# Patient Record
Sex: FEMALE | Race: OTHER | HISPANIC OR LATINO | URBAN - METROPOLITAN AREA
[De-identification: names, ages, dates, MRNs, and addresses within clinical notes are randomized per-mention and may not be internally consistent; named-entity substitution may affect disease eponyms.]

---

## 2020-01-12 ENCOUNTER — EMERGENCY (EMERGENCY)
Facility: HOSPITAL | Age: 75
LOS: 1 days | Discharge: HOME | End: 2020-01-12
Attending: EMERGENCY MEDICINE
Payer: COMMERCIAL

## 2020-01-12 VITALS
WEIGHT: 117.95 LBS | SYSTOLIC BLOOD PRESSURE: 126 MMHG | HEART RATE: 103 BPM | DIASTOLIC BLOOD PRESSURE: 86 MMHG | TEMPERATURE: 98 F | HEIGHT: 60 IN | RESPIRATION RATE: 18 BRPM | OXYGEN SATURATION: 96 %

## 2020-01-12 PROCEDURE — 99283 EMERGENCY DEPT VISIT LOW MDM: CPT | Mod: 25

## 2020-01-12 PROCEDURE — 29125 APPL SHORT ARM SPLINT STATIC: CPT | Mod: RT

## 2020-01-12 PROCEDURE — 29125 APPL SHORT ARM SPLINT STATIC: CPT

## 2020-01-12 PROCEDURE — 99284 EMERGENCY DEPT VISIT MOD MDM: CPT | Mod: 25

## 2020-01-12 PROCEDURE — 73110 X-RAY EXAM OF WRIST: CPT

## 2020-01-12 PROCEDURE — 73110 X-RAY EXAM OF WRIST: CPT | Mod: 26,RT

## 2020-01-12 NOTE — ED ADULT NURSE NOTE - OBJECTIVE STATEMENT
75 yo F aaox4 c/o of R wrist pain onset yesterday s/p fall. Pain radiates from wrist down forearm, but denies numbness and tingling. Bruising noted. No active bleeding. No head trauma no loc. Provider at bedside.

## 2020-01-12 NOTE — ED ADULT NURSE NOTE - NSIMPLEMENTINTERV_GEN_ALL_ED
Implemented All Universal Safety Interventions:  [place] to call system. Call bell, personal items and telephone within reach. Instruct patient to call for assistance. Room bathroom lighting operational. Non-slip footwear when patient is off stretcher. Physically safe environment: no spills, clutter or unnecessary equipment. Stretcher in lowest position, wheels locked, appropriate side rails in place.

## 2020-01-12 NOTE — ED PROVIDER NOTE - NSFOLLOWUPINSTRUCTIONS_ED_ALL_ED_FT
Follow up  ith Dr. De Oliveira for orthopedist   [phone]    keep splint in place-do not remove or get wet  Keep elevated  Do not wear rings on right hand  ice packs to be applied over splint for 20 min at a time    Return to the ER for any concerns    -- Please use 650mg Tylenol (also called acetaminophen) every 4 hours & 600mg Motrin (also called Advil or ibuprofen) every 6 hours as needed for pain/discomfort/swelling. You can get these without a prescription. Don't use more than 3500mg of Tylenol in any 24-hour period. Make sure your other prescription/over-the-counter medications don't contain any Tylenol so you don't take too much. If you have any stomach discomfort while taking Motrin, you can use TUMS or Pepcid or Zantac (these can all be bought without a prescription).

## 2020-01-12 NOTE — ED PROVIDER NOTE - PATIENT PORTAL LINK FT
You can access the FollowMyHealth Patient Portal offered by [facility] by registering at the following website: [email or web address]. By joining [email or web address]’s FollowMyHealth portal, you will also be able to view your health information using other applications (apps) compatible with our system.

## 2020-01-12 NOTE — ED PROVIDER NOTE - TOBACCO USE
Bedside and Verbal shift change report given to Claudio Sheldon RN (oncoming nurse) by Prudence Phalen RN (offgoing nurse). Report included the following information SBAR, Kardex and MAR. Never smoker

## 2020-01-12 NOTE — ED PROVIDER NOTE - OBJECTIVE STATEMENT
73 yo female in room3 R presents to the ER with right wrist injury. Pt states "I was walking and fell on thursday while on vacation in [place]. I was placed in a cast and just got back home and wanted to be evaluated. I have  some pain to my wrist and forearm but it is tolerable. I did not fall or hit my head when I fell the other day.  LEft wrist with skin intact with ecchymosis and sts.  Radial pulses intact.

## 2020-01-12 NOTE — ED PROVIDER NOTE - CARE PLAN
Principal Discharge DX:	Wrist fracture, closed, right, sequela Principal Discharge DX:	Wrist fracture, closed, right, sequela  Goal:	Impacted R distal radius fracture

## 2020-01-12 NOTE — ED PROVIDER NOTE - ATTENDING CONTRIBUTION TO CARE
Patient presenting complaining of R wrist pain.  Mechanical fall while in [place], films there noted fracture, so placed in splint.  Over past two days has developed some numbness in her R thumb.  Has not yet followed up with orthopedics.  Splint removed in Emergency Department, strong underlying radial pulses, thumb well perfused, will repeat XR in Emergency Department to re-evaluate fracture, no evidence of compartment syndrome on exam suspect sensation changes in thumb possibly secondary to fracture - likely resplinting and orthopedics follow up from Emergency Department.

## 2020-01-12 NOTE — ED PROVIDER NOTE - CARE PROVIDER_API CALL
Mitch De Oliveira (MD)  Orthopaedic Surgery  [address] [place] [address], [address]  [place] [address]  Phone: [phone]  Fax: [phone]  Follow Up Time: